# Patient Record
(demographics unavailable — no encounter records)

---

## 2024-11-25 NOTE — HISTORY OF PRESENT ILLNESS
[FreeTextEntry1] : 50 yo female PMHx HLD, HTN, presents with BLE edema and leg cramping. Pt states when she stands or walks for a long period of time she has swelling from knees to ankles bilaterally. She notices it after a long day of hiking. Recently had BLE edema after cooking. She has been using 20-30 mmHg knee high compression stockings when she has episodes of edema. She has cramping in her calves when resting. She feels symptoms are equal bilaterally. She is a current smoker, smokes about 1 pack per week.

## 2024-11-25 NOTE — ASSESSMENT
[FreeTextEntry1] : 48 yo female with BLE edema after standing or walking for long periods of time. No venous insufficiency or DVT on duplex.   Pt counseled on results of duplex and above diagnosis. Pt counseled to continue to use compression stockings  Will refer to tactile for lymphedema massage pump  Pt counseled to walk daily and elevate legs at night RTC PRN if edema and cramping becomes worse   A total of 30 minutes was spent with patient and coordinating care